# Patient Record
Sex: MALE | Race: BLACK OR AFRICAN AMERICAN | Employment: UNEMPLOYED | URBAN - METROPOLITAN AREA
[De-identification: names, ages, dates, MRNs, and addresses within clinical notes are randomized per-mention and may not be internally consistent; named-entity substitution may affect disease eponyms.]

---

## 2021-03-16 ENCOUNTER — APPOINTMENT (OUTPATIENT)
Dept: CT IMAGING | Facility: CLINIC | Age: 36
End: 2021-03-16
Attending: EMERGENCY MEDICINE
Payer: COMMERCIAL

## 2021-03-16 ENCOUNTER — HOSPITAL ENCOUNTER (INPATIENT)
Facility: CLINIC | Age: 36
LOS: 2 days | Discharge: HOME | End: 2021-03-19
Attending: EMERGENCY MEDICINE | Admitting: EMERGENCY MEDICINE
Payer: COMMERCIAL

## 2021-03-16 DIAGNOSIS — R11.2 NAUSEA AND VOMITING, INTRACTABILITY OF VOMITING NOT SPECIFIED, UNSPECIFIED VOMITING TYPE: ICD-10-CM

## 2021-03-16 DIAGNOSIS — R41.82 ALTERED MENTAL STATUS, UNSPECIFIED ALTERED MENTAL STATUS TYPE: ICD-10-CM

## 2021-03-16 DIAGNOSIS — R10.84 GENERALIZED ABDOMINAL PAIN: ICD-10-CM

## 2021-03-16 DIAGNOSIS — Z11.52 ENCOUNTER FOR SCREENING LABORATORY TESTING FOR SEVERE ACUTE RESPIRATORY SYNDROME CORONAVIRUS 2 (SARS-COV-2): ICD-10-CM

## 2021-03-16 DIAGNOSIS — F19.10 POLYSUBSTANCE ABUSE (H): ICD-10-CM

## 2021-03-16 LAB
ALBUMIN SERPL-MCNC: 3.9 G/DL (ref 3.4–5)
ALP SERPL-CCNC: 98 U/L (ref 40–150)
ALT SERPL W P-5'-P-CCNC: 15 U/L (ref 0–70)
AMPHETAMINES UR QL SCN: POSITIVE
ANION GAP SERPL CALCULATED.3IONS-SCNC: 10 MMOL/L (ref 3–14)
AST SERPL W P-5'-P-CCNC: 17 U/L (ref 0–45)
BARBITURATES UR QL: NEGATIVE
BASOPHILS # BLD AUTO: 0 10E9/L (ref 0–0.2)
BASOPHILS NFR BLD AUTO: 0.2 %
BENZODIAZ UR QL: NEGATIVE
BILIRUB SERPL-MCNC: 1 MG/DL (ref 0.2–1.3)
BUN SERPL-MCNC: 10 MG/DL (ref 7–30)
CALCIUM SERPL-MCNC: 9.4 MG/DL (ref 8.5–10.1)
CANNABINOIDS UR QL SCN: POSITIVE
CHLORIDE SERPL-SCNC: 104 MMOL/L (ref 94–109)
CO2 SERPL-SCNC: 23 MMOL/L (ref 20–32)
COCAINE UR QL: POSITIVE
CREAT SERPL-MCNC: 0.89 MG/DL (ref 0.66–1.25)
DIFFERENTIAL METHOD BLD: ABNORMAL
EOSINOPHIL # BLD AUTO: 0 10E9/L (ref 0–0.7)
EOSINOPHIL NFR BLD AUTO: 0 %
ERYTHROCYTE [DISTWIDTH] IN BLOOD BY AUTOMATED COUNT: 12.7 % (ref 10–15)
ETHANOL UR QL SCN: NEGATIVE
GFR SERPL CREATININE-BSD FRML MDRD: >90 ML/MIN/{1.73_M2}
GLUCOSE SERPL-MCNC: 106 MG/DL (ref 70–99)
HCT VFR BLD AUTO: 43 % (ref 40–53)
HGB BLD-MCNC: 14.3 G/DL (ref 13.3–17.7)
IMM GRANULOCYTES # BLD: 0 10E9/L (ref 0–0.4)
IMM GRANULOCYTES NFR BLD: 0.3 %
LACTATE BLD-SCNC: 1.6 MMOL/L (ref 0.7–2)
LIPASE SERPL-CCNC: 64 U/L (ref 73–393)
LYMPHOCYTES # BLD AUTO: 0.8 10E9/L (ref 0.8–5.3)
LYMPHOCYTES NFR BLD AUTO: 5.4 %
MCH RBC QN AUTO: 31.4 PG (ref 26.5–33)
MCHC RBC AUTO-ENTMCNC: 33.3 G/DL (ref 31.5–36.5)
MCV RBC AUTO: 94 FL (ref 78–100)
MONOCYTES # BLD AUTO: 0.4 10E9/L (ref 0–1.3)
MONOCYTES NFR BLD AUTO: 2.8 %
NEUTROPHILS # BLD AUTO: 13 10E9/L (ref 1.6–8.3)
NEUTROPHILS NFR BLD AUTO: 91.3 %
NRBC # BLD AUTO: 0 10*3/UL
NRBC BLD AUTO-RTO: 0 /100
OPIATES UR QL SCN: NEGATIVE
PLATELET # BLD AUTO: 466 10E9/L (ref 150–450)
POTASSIUM SERPL-SCNC: 3.5 MMOL/L (ref 3.4–5.3)
PROT SERPL-MCNC: 8 G/DL (ref 6.8–8.8)
RBC # BLD AUTO: 4.56 10E12/L (ref 4.4–5.9)
SODIUM SERPL-SCNC: 137 MMOL/L (ref 133–144)
TROPONIN I SERPL-MCNC: <0.015 UG/L (ref 0–0.04)
WBC # BLD AUTO: 14.2 10E9/L (ref 4–11)

## 2021-03-16 PROCEDURE — 83690 ASSAY OF LIPASE: CPT | Performed by: EMERGENCY MEDICINE

## 2021-03-16 PROCEDURE — 93005 ELECTROCARDIOGRAM TRACING: CPT | Performed by: EMERGENCY MEDICINE

## 2021-03-16 PROCEDURE — 80320 DRUG SCREEN QUANTALCOHOLS: CPT | Performed by: EMERGENCY MEDICINE

## 2021-03-16 PROCEDURE — 258N000003 HC RX IP 258 OP 636: Performed by: EMERGENCY MEDICINE

## 2021-03-16 PROCEDURE — 250N000009 HC RX 250: Performed by: EMERGENCY MEDICINE

## 2021-03-16 PROCEDURE — 93010 ELECTROCARDIOGRAM REPORT: CPT | Performed by: EMERGENCY MEDICINE

## 2021-03-16 PROCEDURE — 85025 COMPLETE CBC W/AUTO DIFF WBC: CPT | Performed by: EMERGENCY MEDICINE

## 2021-03-16 PROCEDURE — 84484 ASSAY OF TROPONIN QUANT: CPT | Performed by: EMERGENCY MEDICINE

## 2021-03-16 PROCEDURE — 83605 ASSAY OF LACTIC ACID: CPT | Performed by: EMERGENCY MEDICINE

## 2021-03-16 PROCEDURE — 99220 PR INITIAL OBSERVATION CARE,LEVEL III: CPT | Mod: 25 | Performed by: EMERGENCY MEDICINE

## 2021-03-16 PROCEDURE — C9803 HOPD COVID-19 SPEC COLLECT: HCPCS | Performed by: EMERGENCY MEDICINE

## 2021-03-16 PROCEDURE — 80053 COMPREHEN METABOLIC PANEL: CPT | Performed by: EMERGENCY MEDICINE

## 2021-03-16 PROCEDURE — 250N000011 HC RX IP 250 OP 636: Performed by: EMERGENCY MEDICINE

## 2021-03-16 PROCEDURE — 74177 CT ABD & PELVIS W/CONTRAST: CPT

## 2021-03-16 PROCEDURE — 96374 THER/PROPH/DIAG INJ IV PUSH: CPT | Mod: 59 | Performed by: EMERGENCY MEDICINE

## 2021-03-16 PROCEDURE — 250N000013 HC RX MED GY IP 250 OP 250 PS 637: Performed by: EMERGENCY MEDICINE

## 2021-03-16 PROCEDURE — 99285 EMERGENCY DEPT VISIT HI MDM: CPT | Mod: 25 | Performed by: EMERGENCY MEDICINE

## 2021-03-16 PROCEDURE — 80307 DRUG TEST PRSMV CHEM ANLYZR: CPT | Performed by: EMERGENCY MEDICINE

## 2021-03-16 RX ORDER — OLANZAPINE 10 MG/1
10 TABLET, ORALLY DISINTEGRATING ORAL ONCE
Status: COMPLETED | OUTPATIENT
Start: 2021-03-16 | End: 2021-03-16

## 2021-03-16 RX ORDER — VENLAFAXINE 100 MG/1
100 TABLET ORAL 2 TIMES DAILY
COMMUNITY
End: 2021-03-17

## 2021-03-16 RX ORDER — IOPAMIDOL 755 MG/ML
100 INJECTION, SOLUTION INTRAVASCULAR ONCE
Status: COMPLETED | OUTPATIENT
Start: 2021-03-16 | End: 2021-03-16

## 2021-03-16 RX ORDER — TRAZODONE HYDROCHLORIDE 100 MG/1
100 TABLET ORAL AT BEDTIME
COMMUNITY

## 2021-03-16 RX ADMIN — IOPAMIDOL 80 ML: 755 INJECTION, SOLUTION INTRAVENOUS at 16:03

## 2021-03-16 RX ADMIN — SODIUM CHLORIDE 1000 ML: 9 INJECTION, SOLUTION INTRAVENOUS at 15:39

## 2021-03-16 RX ADMIN — SODIUM CHLORIDE 60 ML: 9 INJECTION, SOLUTION INTRAVENOUS at 16:18

## 2021-03-16 RX ADMIN — OLANZAPINE 10 MG: 10 TABLET, ORALLY DISINTEGRATING ORAL at 14:49

## 2021-03-16 ASSESSMENT — ENCOUNTER SYMPTOMS
ABDOMINAL PAIN: 1
SHORTNESS OF BREATH: 0
NAUSEA: 1
VOMITING: 1
FEVER: 0
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
## 2021-03-16 NOTE — ED NOTES
Pt informed of abd CT, has been able to lay still in bed, drifting off to sleep at times. Pt reported that zyprexa is helping to calm him. Pt is dressed in gown, but does not want to take sweat pants off, said he's cold. Pt given warm blankets.

## 2021-03-16 NOTE — ED NOTES
Bed: ED02  Expected date: 3/16/21  Expected time: 1:10 PM  Means of arrival: Ambulance  Comments:  [facility] 37yo male, abd pain, meth use, no covid symptoms

## 2021-03-17 LAB
LABORATORY COMMENT REPORT: NORMAL
SARS-COV-2 RNA RESP QL NAA+PROBE: NEGATIVE
SPECIMEN SOURCE: NORMAL

## 2021-03-17 PROCEDURE — 250N000011 HC RX IP 250 OP 636: Performed by: EMERGENCY MEDICINE

## 2021-03-17 PROCEDURE — 120N000002 HC R&B MED SURG/OB UMMC

## 2021-03-17 PROCEDURE — 99207 PR CDG-MDM COMPONENT: MEETS LOW - DOWN CODED: CPT | Performed by: HOSPITALIST

## 2021-03-17 PROCEDURE — 250N000013 HC RX MED GY IP 250 OP 250 PS 637: Performed by: HOSPITALIST

## 2021-03-17 PROCEDURE — 250N000011 HC RX IP 250 OP 636: Performed by: HOSPITALIST

## 2021-03-17 PROCEDURE — 99222 1ST HOSP IP/OBS MODERATE 55: CPT | Mod: AI | Performed by: HOSPITALIST

## 2021-03-17 PROCEDURE — 99207 PR CDG-CODE CATEGORY CHANGED: CPT | Performed by: HOSPITALIST

## 2021-03-17 PROCEDURE — 87635 SARS-COV-2 COVID-19 AMP PRB: CPT | Performed by: EMERGENCY MEDICINE

## 2021-03-17 PROCEDURE — G0378 HOSPITAL OBSERVATION PER HR: HCPCS

## 2021-03-17 RX ORDER — NALOXONE HYDROCHLORIDE 0.4 MG/ML
0.2 INJECTION, SOLUTION INTRAMUSCULAR; INTRAVENOUS; SUBCUTANEOUS
Status: DISCONTINUED | OUTPATIENT
Start: 2021-03-17 | End: 2021-03-19 | Stop reason: HOSPADM

## 2021-03-17 RX ORDER — PROCHLORPERAZINE 25 MG
25 SUPPOSITORY, RECTAL RECTAL EVERY 12 HOURS PRN
Status: DISCONTINUED | OUTPATIENT
Start: 2021-03-17 | End: 2021-03-19 | Stop reason: HOSPADM

## 2021-03-17 RX ORDER — MORPHINE SULFATE 4 MG/ML
4 INJECTION, SOLUTION INTRAMUSCULAR; INTRAVENOUS ONCE
Status: COMPLETED | OUTPATIENT
Start: 2021-03-17 | End: 2021-03-17

## 2021-03-17 RX ORDER — PROCHLORPERAZINE MALEATE 10 MG
10 TABLET ORAL EVERY 6 HOURS PRN
Status: DISCONTINUED | OUTPATIENT
Start: 2021-03-17 | End: 2021-03-19 | Stop reason: HOSPADM

## 2021-03-17 RX ORDER — ACETAMINOPHEN 325 MG/1
650 TABLET ORAL EVERY 4 HOURS PRN
Status: DISCONTINUED | OUTPATIENT
Start: 2021-03-17 | End: 2021-03-19 | Stop reason: HOSPADM

## 2021-03-17 RX ORDER — SODIUM CHLORIDE AND POTASSIUM CHLORIDE 150; 900 MG/100ML; MG/100ML
INJECTION, SOLUTION INTRAVENOUS CONTINUOUS
Status: DISCONTINUED | OUTPATIENT
Start: 2021-03-17 | End: 2021-03-19

## 2021-03-17 RX ORDER — VENLAFAXINE HYDROCHLORIDE 75 MG/1
75 CAPSULE, EXTENDED RELEASE ORAL DAILY
Status: CANCELLED | OUTPATIENT
Start: 2021-03-17

## 2021-03-17 RX ORDER — PANTOPRAZOLE SODIUM 40 MG/1
40 TABLET, DELAYED RELEASE ORAL
Status: DISCONTINUED | OUTPATIENT
Start: 2021-03-18 | End: 2021-03-19 | Stop reason: HOSPADM

## 2021-03-17 RX ORDER — NALOXONE HYDROCHLORIDE 0.4 MG/ML
0.4 INJECTION, SOLUTION INTRAMUSCULAR; INTRAVENOUS; SUBCUTANEOUS
Status: DISCONTINUED | OUTPATIENT
Start: 2021-03-17 | End: 2021-03-19 | Stop reason: HOSPADM

## 2021-03-17 RX ORDER — VENLAFAXINE HYDROCHLORIDE 75 MG/1
75 CAPSULE, EXTENDED RELEASE ORAL DAILY
COMMUNITY

## 2021-03-17 RX ORDER — ONDANSETRON 2 MG/ML
4 INJECTION INTRAMUSCULAR; INTRAVENOUS EVERY 6 HOURS PRN
Status: DISCONTINUED | OUTPATIENT
Start: 2021-03-17 | End: 2021-03-19 | Stop reason: HOSPADM

## 2021-03-17 RX ORDER — OXYCODONE HYDROCHLORIDE 5 MG/1
5 TABLET ORAL EVERY 4 HOURS PRN
Status: DISCONTINUED | OUTPATIENT
Start: 2021-03-17 | End: 2021-03-19 | Stop reason: HOSPADM

## 2021-03-17 RX ORDER — ACETAMINOPHEN 650 MG/1
650 SUPPOSITORY RECTAL EVERY 4 HOURS PRN
Status: DISCONTINUED | OUTPATIENT
Start: 2021-03-17 | End: 2021-03-19 | Stop reason: HOSPADM

## 2021-03-17 RX ORDER — HYDROMORPHONE HCL IN WATER/PF 6 MG/30 ML
0.2 PATIENT CONTROLLED ANALGESIA SYRINGE INTRAVENOUS EVERY 4 HOURS PRN
Status: DISCONTINUED | OUTPATIENT
Start: 2021-03-17 | End: 2021-03-19 | Stop reason: HOSPADM

## 2021-03-17 RX ORDER — TRAZODONE HYDROCHLORIDE 100 MG/1
100 TABLET ORAL AT BEDTIME
Status: CANCELLED | OUTPATIENT
Start: 2021-03-17

## 2021-03-17 RX ORDER — ONDANSETRON 4 MG/1
4 TABLET, ORALLY DISINTEGRATING ORAL EVERY 6 HOURS PRN
Status: DISCONTINUED | OUTPATIENT
Start: 2021-03-17 | End: 2021-03-19 | Stop reason: HOSPADM

## 2021-03-17 RX ADMIN — POTASSIUM CHLORIDE AND SODIUM CHLORIDE: 900; 150 INJECTION, SOLUTION INTRAVENOUS at 15:16

## 2021-03-17 RX ADMIN — MORPHINE SULFATE 4 MG: 4 INJECTION, SOLUTION INTRAMUSCULAR; INTRAVENOUS at 05:29

## 2021-03-17 RX ADMIN — ACETAMINOPHEN 650 MG: 325 TABLET, FILM COATED ORAL at 21:28

## 2021-03-17 RX ADMIN — ACETAMINOPHEN 650 MG: 325 TABLET, FILM COATED ORAL at 15:18

## 2021-03-17 NOTE — ED PROVIDER NOTES
Emergency Department Patient Sign-out       Brief HPI:  This is a 36 year old male signed out to me by Dr. Gonzalez .  See initial ED Provider note for details of the presentation.            Significant Events prior to my assuming care: Patient presented with altered mental status, vomiting and abdominal pain.  Admitted to the use of multiple substances including methamphetamine.  Was evaluated medically and noted to have partial small bowel obstruction.  Please refer to Dr. Gonzalez note for details.  Is being treated symptomatically with plan to admit to the inpatient unit.  Due to concerns of bed availability current plan is for admission to [facility] when a bed is open there.      Exam:   Patient Vitals for the past 24 hrs:   BP Temp Temp src Pulse Resp SpO2 Weight   03/17/21 0642 (!) 160/113 -- -- 60 -- 98 % --   03/17/21 0630 -- -- -- -- -- 100 % --   03/17/21 0600 -- -- -- -- -- 100 % --   03/17/21 0508 (!) 179/124 -- -- 77 18 98 % --   03/17/21 0230 -- -- -- -- -- 100 % --   03/17/21 0200 -- -- -- -- -- 93 % --   03/17/21 0130 -- -- -- -- -- 99 % --   03/17/21 0100 (!) 158/111 -- -- 59 -- 98 % --   03/17/21 0051 (!) 160/114 -- -- 63 16 100 % --   03/16/21 1952 103/86 -- -- 58 16 97 % --   03/16/21 1500 -- -- -- -- -- -- 74.7 kg (164 lb 9.6 oz)   03/16/21 1452 (!) 182/113 97.5  F (36.4  C) Oral 78 20 100 % --       EXAM:  General: She is sleeping, appears in no distress  HEENT: Normal.  Oropharynx clear and moist.  Neck: Supple, trachea midline, normal voice  Chest:  No respiratory distress, chest wall nontender, lungs clear in all fields  CV: Regular rate and rhythm, no murmur, normal pulse, no jugular venous distention  Abdomen: Not significantly distended.  Diffuse moderate tenderness.  Extremities: No edema or tenderness          ED RESULTS:   Results for orders placed or performed during the hospital encounter of 03/16/21 (from the past 24 hour(s))   Drug abuse screen 6 urine (chem dep)      Status: Abnormal    Collection Time: 03/16/21  1:59 PM   Result Value Ref Range    Amphetamine Qual Urine Positive (A) NEG^Negative    Barbiturates Qual Urine Negative NEG^Negative    Benzodiazepine Qual Urine Negative NEG^Negative    Cannabinoids Qual Urine Positive (A) NEG^Negative    Cocaine Qual Urine Positive (A) NEG^Negative    Ethanol Qual Urine Negative NEG^Negative    Opiates Qualitative Urine Negative NEG^Negative   CBC with platelets differential     Status: Abnormal    Collection Time: 03/16/21  2:51 PM   Result Value Ref Range    WBC 14.2 (H) 4.0 - 11.0 10e9/L    RBC Count 4.56 4.4 - 5.9 10e12/L    Hemoglobin 14.3 13.3 - 17.7 g/dL    Hematocrit 43.0 40.0 - 53.0 %    MCV 94 78 - 100 fl    MCH 31.4 26.5 - 33.0 pg    MCHC 33.3 31.5 - 36.5 g/dL    RDW 12.7 10.0 - 15.0 %    Platelet Count 466 (H) 150 - 450 10e9/L    Diff Method Automated Method     % Neutrophils 91.3 %    % Lymphocytes 5.4 %    % Monocytes 2.8 %    % Eosinophils 0.0 %    % Basophils 0.2 %    % Immature Granulocytes 0.3 %    Nucleated RBCs 0 0 /100    Absolute Neutrophil 13.0 (H) 1.6 - 8.3 10e9/L    Absolute Lymphocytes 0.8 0.8 - 5.3 10e9/L    Absolute Monocytes 0.4 0.0 - 1.3 10e9/L    Absolute Eosinophils 0.0 0.0 - 0.7 10e9/L    Absolute Basophils 0.0 0.0 - 0.2 10e9/L    Abs Immature Granulocytes 0.0 0 - 0.4 10e9/L    Absolute Nucleated RBC 0.0    Comprehensive metabolic panel     Status: Abnormal    Collection Time: 03/16/21  2:51 PM   Result Value Ref Range    Sodium 137 133 - 144 mmol/L    Potassium 3.5 3.4 - 5.3 mmol/L    Chloride 104 94 - 109 mmol/L    Carbon Dioxide 23 20 - 32 mmol/L    Anion Gap 10 3 - 14 mmol/L    Glucose 106 (H) 70 - 99 mg/dL    Urea Nitrogen 10 7 - 30 mg/dL    Creatinine 0.89 0.66 - 1.25 mg/dL    GFR Estimate >90 >60 mL/min/[1.73_m2]    GFR Estimate If Black >90 >60 mL/min/[1.73_m2]    Calcium 9.4 8.5 - 10.1 mg/dL    Bilirubin Total 1.0 0.2 - 1.3 mg/dL    Albumin 3.9 3.4 - 5.0 g/dL    Protein Total 8.0 6.8 - 8.8  g/dL    Alkaline Phosphatase 98 40 - 150 U/L    ALT 15 0 - 70 U/L    AST 17 0 - 45 U/L   Lipase     Status: Abnormal    Collection Time: 03/16/21  2:51 PM   Result Value Ref Range    Lipase 64 (L) 73 - 393 U/L   Lactic acid     Status: None    Collection Time: 03/16/21  2:51 PM   Result Value Ref Range    Lactic Acid 1.6 0.7 - 2.0 mmol/L   Troponin I     Status: None    Collection Time: 03/16/21  2:51 PM   Result Value Ref Range    Troponin I ES <0.015 0.000 - 0.045 ug/L   CT Abdomen Pelvis w Contrast     Status: None    Collection Time: 03/16/21  4:19 PM    Narrative    CT ABDOMEN AND PELVIS WITH CONTRAST 3/16/2021 4:19 PM    CLINICAL HISTORY: 6 months of abdominal pain. Bowel obstruction  suspected.    TECHNIQUE: CT scan of the abdomen and pelvis was performed following  injection of IV contrast. Multiplanar reformats were obtained. Dose  reduction techniques were used.    CONTRAST: 80mL Isovue 370    COMPARISON: None.    FINDINGS:   LOWER CHEST: No infiltrates or effusions.    HEPATOBILIARY: No significant mass or bile duct dilatation. No  calcified gallstones.     PANCREAS: No significant mass, duct dilatation, or inflammatory  change.    SPLEEN: Normal size.    ADRENAL GLANDS: No significant nodules.    KIDNEYS/BLADDER: No significant mass, stones, or hydronephrosis.    BOWEL: Motion artifact limits detail in the abdomen, there are a few  mildly prominent loops of small bowel which may indicate early or  partial small bowel obstruction. Focal ileus would be another  consideration.    PELVIC ORGANS: No pelvic masses. Somewhat high riding testicle vs.  testicle in the inguinal canal on the right.    ADDITIONAL FINDINGS: Trace free fluid.    MUSCULOSKELETAL: Survey of the visualized bony structures demonstrates  no destructive bony lesions.      Impression    IMPRESSION:   1.  A few mildly prominent small bowel loops are evident, it is  unclear if this is an early or partial small bowel obstruction vs.  ileus.  Motion artifact limits detail.  2.  Somewhat high riding testicle vs. testicle in inguinal canal on  the right.    DMITRY NATH MD   Asymptomatic SARS-CoV-2 COVID-19 Virus (Coronavirus) by PCR     Status: None    Collection Time: 03/17/21  5:23 AM    Specimen: Nasopharyngeal   Result Value Ref Range    SARS-CoV-2 Virus Specimen Source Nasopharyngeal     SARS-CoV-2 PCR Result NEGATIVE     SARS-CoV-2 PCR Comment (Note)        ED MEDICATIONS:   Medications   OLANZapine zydis (zyPREXA) ODT tab 10 mg (10 mg Oral Given 3/16/21 1449)   0.9% sodium chloride BOLUS (1,000 mLs Intravenous New Bag 3/16/21 1539)   iopamidol (ISOVUE-370) solution 100 mL (80 mLs Intravenous Given 3/16/21 1603)   sodium chloride 0.9 % bag 100mL (60 mLs Intravenous Given 3/16/21 1618)   morphine (PF) injection 4 mg (4 mg Intravenous Given 3/17/21 0511)         Impression:    ICD-10-CM    1. Polysubstance abuse (H)  F19.10 Troponin I     Troponin I     Asymptomatic SARS-CoV-2 COVID-19 Virus (Coronavirus) by PCR     CANCELED: Troponin I   2. Generalized abdominal pain  R10.84    3. Altered mental status, unspecified altered mental status type  R41.82        Plan:    Pending studies include no studies pending at the time of signout  Plan for inpatient medical admission, currently bed will be available at [facility] later on the shift.  We will treat symptomatically and monitor until that time..        MD Staica Bean David, MD  03/17/21 7420
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
## 2021-03-17 NOTE — ED NOTES
Emergency Department Patient Sign-out       Brief HPI:  This is a 36 year old male signed out to me by Dr. Thomas .  See initial ED Provider note for details of the presentation.            Significant Events prior to my assuming care: Patient presenting with abd pain and polysubstance use. Has ? SBO on CT, ongoing pain with nausea/vomiting. Plan for admission.      Exam:   Patient Vitals for the past 24 hrs:   BP Temp Temp src Pulse Resp SpO2 Weight   03/17/21 0508 (!) 179/124 -- -- 77 18 98 % --   03/17/21 0230 -- -- -- -- -- 100 % --   03/17/21 0200 -- -- -- -- -- 93 % --   03/17/21 0130 -- -- -- -- -- 99 % --   03/17/21 0100 (!) 158/111 -- -- 59 -- 98 % --   03/17/21 0051 (!) 160/114 -- -- 63 16 100 % --   03/16/21 1952 103/86 -- -- 58 16 97 % --   03/16/21 1500 -- -- -- -- -- -- 74.7 kg (164 lb 9.6 oz)   03/16/21 1452 (!) 182/113 97.5  F (36.4  C) Oral 78 20 100 % --           ED RESULTS:   Results for orders placed or performed during the hospital encounter of 03/16/21 (from the past 24 hour(s))   Drug abuse screen 6 urine (chem dep)     Status: Abnormal    Collection Time: 03/16/21  1:59 PM   Result Value Ref Range    Amphetamine Qual Urine Positive (A) NEG^Negative    Barbiturates Qual Urine Negative NEG^Negative    Benzodiazepine Qual Urine Negative NEG^Negative    Cannabinoids Qual Urine Positive (A) NEG^Negative    Cocaine Qual Urine Positive (A) NEG^Negative    Ethanol Qual Urine Negative NEG^Negative    Opiates Qualitative Urine Negative NEG^Negative   CBC with platelets differential     Status: Abnormal    Collection Time: 03/16/21  2:51 PM   Result Value Ref Range    WBC 14.2 (H) 4.0 - 11.0 10e9/L    RBC Count 4.56 4.4 - 5.9 10e12/L    Hemoglobin 14.3 13.3 - 17.7 g/dL    Hematocrit 43.0 40.0 - 53.0 %    MCV 94 78 - 100 fl    MCH 31.4 26.5 - 33.0 pg    MCHC 33.3 31.5 - 36.5 g/dL    RDW 12.7 10.0 - 15.0 %    Platelet Count 466 (H) 150 - 450 10e9/L    Diff Method Automated Method     % Neutrophils  91.3 %    % Lymphocytes 5.4 %    % Monocytes 2.8 %    % Eosinophils 0.0 %    % Basophils 0.2 %    % Immature Granulocytes 0.3 %    Nucleated RBCs 0 0 /100    Absolute Neutrophil 13.0 (H) 1.6 - 8.3 10e9/L    Absolute Lymphocytes 0.8 0.8 - 5.3 10e9/L    Absolute Monocytes 0.4 0.0 - 1.3 10e9/L    Absolute Eosinophils 0.0 0.0 - 0.7 10e9/L    Absolute Basophils 0.0 0.0 - 0.2 10e9/L    Abs Immature Granulocytes 0.0 0 - 0.4 10e9/L    Absolute Nucleated RBC 0.0    Comprehensive metabolic panel     Status: Abnormal    Collection Time: 03/16/21  2:51 PM   Result Value Ref Range    Sodium 137 133 - 144 mmol/L    Potassium 3.5 3.4 - 5.3 mmol/L    Chloride 104 94 - 109 mmol/L    Carbon Dioxide 23 20 - 32 mmol/L    Anion Gap 10 3 - 14 mmol/L    Glucose 106 (H) 70 - 99 mg/dL    Urea Nitrogen 10 7 - 30 mg/dL    Creatinine 0.89 0.66 - 1.25 mg/dL    GFR Estimate >90 >60 mL/min/[1.73_m2]    GFR Estimate If Black >90 >60 mL/min/[1.73_m2]    Calcium 9.4 8.5 - 10.1 mg/dL    Bilirubin Total 1.0 0.2 - 1.3 mg/dL    Albumin 3.9 3.4 - 5.0 g/dL    Protein Total 8.0 6.8 - 8.8 g/dL    Alkaline Phosphatase 98 40 - 150 U/L    ALT 15 0 - 70 U/L    AST 17 0 - 45 U/L   Lipase     Status: Abnormal    Collection Time: 03/16/21  2:51 PM   Result Value Ref Range    Lipase 64 (L) 73 - 393 U/L   Lactic acid     Status: None    Collection Time: 03/16/21  2:51 PM   Result Value Ref Range    Lactic Acid 1.6 0.7 - 2.0 mmol/L   Troponin I     Status: None    Collection Time: 03/16/21  2:51 PM   Result Value Ref Range    Troponin I ES <0.015 0.000 - 0.045 ug/L   CT Abdomen Pelvis w Contrast     Status: None    Collection Time: 03/16/21  4:19 PM    Narrative    CT ABDOMEN AND PELVIS WITH CONTRAST 3/16/2021 4:19 PM    CLINICAL HISTORY: 6 months of abdominal pain. Bowel obstruction  suspected.    TECHNIQUE: CT scan of the abdomen and pelvis was performed following  injection of IV contrast. Multiplanar reformats were obtained. Dose  reduction techniques were  used.    CONTRAST: 80mL Isovue 370    COMPARISON: None.    FINDINGS:   LOWER CHEST: No infiltrates or effusions.    HEPATOBILIARY: No significant mass or bile duct dilatation. No  calcified gallstones.     PANCREAS: No significant mass, duct dilatation, or inflammatory  change.    SPLEEN: Normal size.    ADRENAL GLANDS: No significant nodules.    KIDNEYS/BLADDER: No significant mass, stones, or hydronephrosis.    BOWEL: Motion artifact limits detail in the abdomen, there are a few  mildly prominent loops of small bowel which may indicate early or  partial small bowel obstruction. Focal ileus would be another  consideration.    PELVIC ORGANS: No pelvic masses. Somewhat high riding testicle vs.  testicle in the inguinal canal on the right.    ADDITIONAL FINDINGS: Trace free fluid.    MUSCULOSKELETAL: Survey of the visualized bony structures demonstrates  no destructive bony lesions.      Impression    IMPRESSION:   1.  A few mildly prominent small bowel loops are evident, it is  unclear if this is an early or partial small bowel obstruction vs.  ileus. Motion artifact limits detail.  2.  Somewhat high riding testicle vs. testicle in inguinal canal on  the right.    DMITRY NATH MD       ED MEDICATIONS:   Medications   morphine (PF) injection 4 mg (has no administration in time range)   OLANZapine zydis (zyPREXA) ODT tab 10 mg (10 mg Oral Given 3/16/21 1449)   0.9% sodium chloride BOLUS (1,000 mLs Intravenous New Bag 3/16/21 1539)   iopamidol (ISOVUE-370) solution 100 mL (80 mLs Intravenous Given 3/16/21 1603)   sodium chloride 0.9 % bag 100mL (60 mLs Intravenous Given 3/16/21 1618)         Impression:    ICD-10-CM    1. Polysubstance abuse (H)  F19.10 Troponin I     Troponin I     CANCELED: Troponin I   2. Generalized abdominal pain  R10.84    3. Altered mental status, unspecified altered mental status type  R41.82        Plan:    Patient presenting with abd pain and CT concerning for SBO. Symptoms consistent with  SBO, plan for medicine admit. No transition point. Pain meds given, hopefully will help BP.        MD Carlos Ramachandran, Mat Mejia MD  03/17/21 0530
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
## 2021-03-17 NOTE — PLAN OF CARE
Blood pressure 135/80, pulse 67, temperature 98.6  F (37  C), temperature source Oral, resp. rate 16, weight 74.7 kg (164 lb 9.6 oz), SpO2 98 %.    Settled pt to unit at 1455. VSS. Pulse ox placed. IV fluids running at 100 ml/hour via R PIV. L PIV S/L. Reports abd pain, treated with tylenol. AxOx4. Denies SOB, CP, N/T. Very lethargic, arouses to voice. Drifts off to sleep between questions. Cooperative with cares. Oriented to room. Report and care passed off to evening RN.

## 2021-03-17 NOTE — PHARMACY-ADMISSION MEDICATION HISTORY
Admission Medication History Completed by Pharmacy    See [facility] Admission Navigator for allergy information, preferred outpatient pharmacy, prior to admission medications and immunization status.     Medication History Sources:     Patient: Navi Reaves    Sure Scripts    Changes made to PTA medication list (reason):    Added: Maalox    Deleted: None    Changed: None    Additional Information:    Patient has not been able to obtain refills for multiple months now, so he has not been able to take his PTA medications    This medication list accurately reflects the patient's PTA med list to the best of my ability based off of the available information at this time.     Prior to Admission medications    Medication Sig Last Dose Taking? Auth Provider   alum & mag hydroxide-simethicone (MAALOX) 200-200-25 MG CHEW chewable tablet Take 2 tablets by mouth every 4 hours as needed for indigestion  Yes Unknown, Entered By History   traZODone (DESYREL) 100 MG tablet Take 100 mg by mouth At Bedtime More than a month at Unknown time  Reported, Patient   venlafaxine (EFFEXOR-XR) 75 MG 24 hr capsule Take 75 mg by mouth daily More than a month at Unknown time  Unknown, Entered By History       Date completed: 03/17/21    Medication history completed by: Tariq Gillespie [facility]

## 2021-03-18 LAB
ALBUMIN SERPL-MCNC: 3 G/DL (ref 3.4–5)
ALP SERPL-CCNC: 79 U/L (ref 40–150)
ALT SERPL W P-5'-P-CCNC: 11 U/L (ref 0–70)
ANION GAP SERPL CALCULATED.3IONS-SCNC: 5 MMOL/L (ref 3–14)
AST SERPL W P-5'-P-CCNC: 9 U/L (ref 0–45)
BILIRUB SERPL-MCNC: 0.6 MG/DL (ref 0.2–1.3)
BUN SERPL-MCNC: 14 MG/DL (ref 7–30)
CALCIUM SERPL-MCNC: 8.4 MG/DL (ref 8.5–10.1)
CHLORIDE SERPL-SCNC: 110 MMOL/L (ref 94–109)
CO2 SERPL-SCNC: 23 MMOL/L (ref 20–32)
CREAT SERPL-MCNC: 0.99 MG/DL (ref 0.66–1.25)
ERYTHROCYTE [DISTWIDTH] IN BLOOD BY AUTOMATED COUNT: 13 % (ref 10–15)
GFR SERPL CREATININE-BSD FRML MDRD: >90 ML/MIN/{1.73_M2}
GLUCOSE SERPL-MCNC: 87 MG/DL (ref 70–99)
HCT VFR BLD AUTO: 44.3 % (ref 40–53)
HGB BLD-MCNC: 14.2 G/DL (ref 13.3–17.7)
INTERPRETATION ECG - MUSE: NORMAL
LIPASE SERPL-CCNC: 59 U/L (ref 73–393)
MCH RBC QN AUTO: 30.9 PG (ref 26.5–33)
MCHC RBC AUTO-ENTMCNC: 32.1 G/DL (ref 31.5–36.5)
MCV RBC AUTO: 97 FL (ref 78–100)
PLATELET # BLD AUTO: 451 10E9/L (ref 150–450)
POTASSIUM SERPL-SCNC: 5 MMOL/L (ref 3.4–5.3)
PROT SERPL-MCNC: 6.8 G/DL (ref 6.8–8.8)
RBC # BLD AUTO: 4.59 10E12/L (ref 4.4–5.9)
SODIUM SERPL-SCNC: 138 MMOL/L (ref 133–144)
WBC # BLD AUTO: 12.8 10E9/L (ref 4–11)

## 2021-03-18 PROCEDURE — 80053 COMPREHEN METABOLIC PANEL: CPT | Performed by: HOSPITALIST

## 2021-03-18 PROCEDURE — 83690 ASSAY OF LIPASE: CPT | Performed by: HOSPITALIST

## 2021-03-18 PROCEDURE — 36415 COLL VENOUS BLD VENIPUNCTURE: CPT | Performed by: HOSPITALIST

## 2021-03-18 PROCEDURE — 250N000011 HC RX IP 250 OP 636: Performed by: HOSPITALIST

## 2021-03-18 PROCEDURE — 99232 SBSQ HOSP IP/OBS MODERATE 35: CPT | Performed by: HOSPITALIST

## 2021-03-18 PROCEDURE — 85027 COMPLETE CBC AUTOMATED: CPT | Performed by: HOSPITALIST

## 2021-03-18 PROCEDURE — 250N000013 HC RX MED GY IP 250 OP 250 PS 637: Performed by: HOSPITALIST

## 2021-03-18 PROCEDURE — 120N000002 HC R&B MED SURG/OB UMMC

## 2021-03-18 RX ADMIN — ACETAMINOPHEN 650 MG: 325 TABLET, FILM COATED ORAL at 20:17

## 2021-03-18 RX ADMIN — ACETAMINOPHEN 650 MG: 325 TABLET, FILM COATED ORAL at 14:45

## 2021-03-18 RX ADMIN — ACETAMINOPHEN 650 MG: 325 TABLET, FILM COATED ORAL at 03:55

## 2021-03-18 RX ADMIN — HYDROMORPHONE HYDROCHLORIDE 0.2 MG: 0.2 INJECTION, SOLUTION INTRAMUSCULAR; INTRAVENOUS; SUBCUTANEOUS at 22:21

## 2021-03-18 RX ADMIN — POTASSIUM CHLORIDE AND SODIUM CHLORIDE: 900; 150 INJECTION, SOLUTION INTRAVENOUS at 01:02

## 2021-03-18 RX ADMIN — PANTOPRAZOLE SODIUM 40 MG: 40 TABLET, DELAYED RELEASE ORAL at 11:25

## 2021-03-18 NOTE — PROGRESS NOTES
I reviewed and approve Sunny Dash note. Updates since 1500: patient tolerating full liquid diet. States he would like melatonin at bed time. Reports no pain. Bed alarm on for generalized pt weakness.

## 2021-03-18 NOTE — PROGRESS NOTES
VS: VSS. Denies CP/SOB. Lethargic but easily aroused. Oriented x4   O2: >90% on RA. On cont pulse ox   Output: Voiding adequate amounts w/o pain or difficulty. Using urinal    Last BM: 3/15 per pt report. Bowel sounds faint. Soft/nontender to touch. Passing gas   Activity: Not OOB, slept majority of shift.    Up for meals? Declined    Skin: Scars on abdomen   Pain: Abdominal discomfort, tylenol given    CMS: Intact    Dressing: None   Diet: NPO except ice chips and sips of water with meds   LDA: PIV infusing    Equipment: IV pole   Plan: TBD   Additional Info:

## 2021-03-18 NOTE — PLAN OF CARE
Pt A&O x's 4. VSS. Afebrile. 02 sats in the 90s on RA. Lungs clear. Denies SOB, chest pain or nausea but does report slight abdominal discomfort. Pt given prn tylenol. NPO except for med and ice chips. Bowel sound active but faint   No BM, pt not sure if passing gas. Voiding into urinal. CMS intact, denies N/T. Pt slept through the shift, PIV patent and infusing. Pt is able to make needs known, call light with in reach. Will continue to monitor.
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
## 2021-03-18 NOTE — PLAN OF CARE
VS: Stable, lungs clear, A/O x4, p has generalized weakness. Bed alarm on and fall wrist band on    O2: Room air   Output: Voiding in the urinal.   Last BM: 3/15   Activity: Bedrest with bed alarm   Up for meals? Was NPO, advanced full liquid diet   Skin: CDI except for IV access in both arms   Pain: Had pain after palpation,pain eased with change in position. Pt has prn tylenol for pain per MAR .   CMS: Intact except for movement limitation   Dressing: none   Diet: Full liquid diet   LDA: PIV left and right arm, patent and saline locked.   Equipment: IV pole and bump, belongings   Plan: Continue plan of care, pt diet advanced to full liquid. Seeing how pt tolerates diet.   Additional Info:

## 2021-03-19 VITALS
BODY MASS INDEX: 21.13 KG/M2 | OXYGEN SATURATION: 98 % | WEIGHT: 164.6 LBS | RESPIRATION RATE: 16 BRPM | TEMPERATURE: 98.8 F | HEART RATE: 63 BPM | SYSTOLIC BLOOD PRESSURE: 131 MMHG | DIASTOLIC BLOOD PRESSURE: 79 MMHG

## 2021-03-19 PROCEDURE — 99239 HOSP IP/OBS DSCHRG MGMT >30: CPT | Performed by: HOSPITALIST

## 2021-03-19 PROCEDURE — 250N000013 HC RX MED GY IP 250 OP 250 PS 637: Performed by: HOSPITALIST

## 2021-03-19 RX ORDER — ACETAMINOPHEN 325 MG/1
650 TABLET ORAL EVERY 4 HOURS PRN
Start: 2021-03-19

## 2021-03-19 RX ADMIN — ACETAMINOPHEN 650 MG: 325 TABLET, FILM COATED ORAL at 05:14

## 2021-03-19 RX ADMIN — PANTOPRAZOLE SODIUM 40 MG: 40 TABLET, DELAYED RELEASE ORAL at 08:04
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
## 2021-03-19 NOTE — PROGRESS NOTES
"Focus: patients anxiety  DB: Patient extremely agitated and angry walking up and down the halls very suspicious behavior.   I: Called Security to come and help with situation.   E:  and security spoke with patient and reassured him that they will obtain gift card to [employer] and donated sweatshirt and shoes if available. Security and  asked patient to stay in room until they both came back to speak with him again. Patient stated\" I will leave as soon as you can get me those things.\" Security arrived back on unit with some shoes and clothing.  arrived back on unit with gift card. Both are in room currently. Will continue to monitor.   "

## 2021-03-19 NOTE — PLAN OF CARE
Pt A&O x's 4. VSS. Afebrile. Pt is on continuous pulse-ox, sats in the upper 90s on RA.  Lungs clear. Denies SOB, chest pain or nausea. Tolerating full liquid  diet. Bowel sound active in all quadrants.Last BM was 3/15. Pt stated he's very close to having BM. Voiding adequate amount into the urinal. Denies pain when asked. CMS intact, denies N/T. PIV patent and SL. Pt slept between care. Bed alarm activated for safety. Pt is able to make needs known, call light with in reach. Will continue to monitor.

## 2021-03-19 NOTE — CONSULTS
Brief Social Work Note    SEMAJ was consulted by nursing supervisor Jennifer to inquire about the pt's CD consult that was placed on 3/17.     SEMAJ established that the regular  [employer] is out this week.  SEMAJ reached out to her coverage Christie Washington who relayed that she reached out to the pt on his room phone to complete the CD assessment and he did not answer.    SW met with the pt at bedside - he declined wanting a CD assessment today - he is discharging today and has his own plans to follow up with a Rule 25 assessment in the community - pt denied needing CD resources or any other SW assistance.     SEMAJ notified bedside and charge nurse and paged Dr. Dumont to inform.  SEMAJ updated nursing supervisor Jennifer.     SW is available to assist in discharge planning, should the need arise.     ADDEND 1500: SEMAJ was consulted by nursing staff and patient relations staff Leny Mckinney (ph: [phone]). Pt was ready for discharge and discovered that his belongings were missing - he reported that he was missing his backpack, $16 and brand new tennis shoes.  Pt became escalated when he discovered that his belongings were not with security and became escalated on the unit.  Security was called and SW met with pt and security to de-escalate pt.  SW provided empathetic listening and validated his concerns about losing his items. Pt reported that he is homeless and does not have many resources.  SW provided pt with $50 gift card to [employer], $25 gift card to [employer] and $50 bus card out for SW resources (form filled out).  Pt thanked SEMAJ for the gift card and de-escalated.  Security provided pt with shoes, hat and scrub bottoms to leave the hospital in.  Security successfully escorted the pt off of the unit.     Jana Banerjee [facility]  5 Ortho [address] 8A   Ph: [phone]  Pager: [phone]

## 2021-03-19 NOTE — CONSULTS
3/19/2021    CD consult acknowledged.   Pt was called this morning via telephone for a CD consult. Phone was busy so plan was to follow up this afternoon. Pt's SW contacted writer and reports pt is able to access an assessment in the community and is declining a consult at this time.   If pt would like to schedule an assessment through [facility] after discharge, he can contact [facility] [phone].  Christie Washington, [facility]  [phone]

## 2021-03-19 NOTE — PLAN OF CARE
VS: /79 (BP Location: Left arm)   Pulse 63   Temp 98.8  F (37.1  C) (Oral)   Resp 16   Wt 74.7 kg (164 lb 9.6 oz)   SpO2 98%   BMI 21.13 kg/m       O2: >90% on room air    Output: Voiding spontaneously with no difficulties    Last BM: 3/19/2021   Activity: Up ad elisabeth    Skin: Intact    Pain: Denies    CMS: Intact    Dressing: NA   Diet: Low fiber diet    LDA: PIV saline locked    Equipment: IV pole/pump, call light within patient reach    Plan: Possible discharge to home    Additional Info: Patient c/o missing belongings (blue overall, all black nike, a brown strap bag with medications/ and cash (pt. Unable to state how much cash). Writer contacted security, who states they have nothing belonging to patient. Writer also contacted [facility] ED, with no luck. Nurse manager notified. Patient encouraged to contact patient relations.      Pt. discharged at 1455 to Home. Pt. was accompanied by hospital security staff, and left with personal belongings. Prior to discharge, PIV was removed. Pt. received complete discharge paperwork. Pt. was given times of last dose for all discharge medications in writing on discharge medication sheets.  Discharge teaching included medication, pain management, activity restrictions, dressing changes, and signs and symptoms of infection. Pt. to follow up with primary doctor in 7 days. Pt. had no further questions at the time of discharge and no unmet needs were identified.

## 2021-03-19 NOTE — PLAN OF CARE
VS: VSS   O2: > 90% on RA. No SOB or CP. Lung sounds clear.   Output: Pt voiding spontaneously   Last BM: 3/15 per report. Pt unable to remember   Activity: Up ad elisabeth but some general weakness present. Bed alarm present   Skin: Intact   Pain: Managed with tylenol and IV dilaudid for 9/10 abdominal pain   CMS: Intact, no N&T   Dressing: None   Diet: Full liquid   LDA: PIVs saline locked   Equipment: Bed alarm, personal belongings, cont. Pulse ox.    Plan: Continue to monitor   Additional Info:

## 2021-03-20 ENCOUNTER — PATIENT OUTREACH (OUTPATIENT)
Dept: CARE COORDINATION | Facility: CLINIC | Age: 36
End: 2021-03-20

## 2021-03-23 NOTE — PROGRESS NOTES
Attempted to contact the patient x3 for post-hospital call without answer. Will close encounter at this time.    Afua Rojas CMA, [facility]  Post Hospital Discharge Team